# Patient Record
Sex: MALE | URBAN - METROPOLITAN AREA
[De-identification: names, ages, dates, MRNs, and addresses within clinical notes are randomized per-mention and may not be internally consistent; named-entity substitution may affect disease eponyms.]

---

## 2024-02-28 ENCOUNTER — APPOINTMENT (OUTPATIENT)
Dept: RADIOLOGY | Facility: MEDICAL CENTER | Age: 59
End: 2024-02-28
Attending: EMERGENCY MEDICINE

## 2024-02-28 ENCOUNTER — APPOINTMENT (OUTPATIENT)
Dept: RADIOLOGY | Facility: MEDICAL CENTER | Age: 59
End: 2024-02-28

## 2024-02-28 ENCOUNTER — HOSPITAL ENCOUNTER (EMERGENCY)
Facility: MEDICAL CENTER | Age: 59
End: 2024-02-28
Attending: EMERGENCY MEDICINE

## 2024-02-28 VITALS
SYSTOLIC BLOOD PRESSURE: 140 MMHG | WEIGHT: 190 LBS | TEMPERATURE: 97.1 F | BODY MASS INDEX: 23.14 KG/M2 | HEIGHT: 76 IN | DIASTOLIC BLOOD PRESSURE: 71 MMHG | RESPIRATION RATE: 16 BRPM | OXYGEN SATURATION: 95 % | HEART RATE: 78 BPM
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
## 2024-02-28 DIAGNOSIS — S06.0X1A CONCUSSION WITH LOSS OF CONSCIOUSNESS OF 30 MINUTES OR LESS, INITIAL ENCOUNTER: ICD-10-CM

## 2024-02-28 DIAGNOSIS — S27.329A CONTUSION OF LUNG, UNSPECIFIED LATERALITY, INITIAL ENCOUNTER: ICD-10-CM

## 2024-02-28 DIAGNOSIS — S16.1XXA STRAIN OF NECK MUSCLE, INITIAL ENCOUNTER: ICD-10-CM

## 2024-02-28 LAB
ABO GROUP BLD: NORMAL
ALBUMIN SERPL BCP-MCNC: 4 G/DL (ref 3.2–4.9)
ALBUMIN/GLOB SERPL: 1.3 G/DL
ALP SERPL-CCNC: 65 U/L (ref 30–99)
ALT SERPL-CCNC: 17 U/L (ref 2–50)
ANION GAP SERPL CALC-SCNC: 14 MMOL/L (ref 7–16)
APTT PPP: 29.6 SEC (ref 24.7–36)
AST SERPL-CCNC: 27 U/L (ref 12–45)
BILIRUB SERPL-MCNC: 0.4 MG/DL (ref 0.1–1.5)
BLD GP AB SCN SERPL QL: NORMAL
BUN SERPL-MCNC: 16 MG/DL (ref 8–22)
CALCIUM ALBUM COR SERPL-MCNC: 9 MG/DL (ref 8.5–10.5)
CALCIUM SERPL-MCNC: 9 MG/DL (ref 8.5–10.5)
CFT BLD TEG: 4.7 MIN (ref 4.6–9.1)
CFT P HPASE BLD TEG: 4.2 MIN (ref 4.3–8.3)
CHLORIDE SERPL-SCNC: 102 MMOL/L (ref 96–112)
CLOT ANGLE BLD TEG: 74.7 DEGREES (ref 63–78)
CLOT LYSIS 30M P MA LENFR BLD TEG: 0 % (ref 0–2.6)
CO2 SERPL-SCNC: 23 MMOL/L (ref 20–33)
CREAT SERPL-MCNC: 0.66 MG/DL (ref 0.5–1.4)
CT.EXTRINSIC BLD ROTEM: 1.1 MIN (ref 0.8–2.1)
ERYTHROCYTE [DISTWIDTH] IN BLOOD BY AUTOMATED COUNT: 38.4 FL (ref 35.9–50)
ETHANOL BLD-MCNC: <10.1 MG/DL
GFR SERPLBLD CREATININE-BSD FMLA CKD-EPI: 108 ML/MIN/1.73 M 2
GLOBULIN SER CALC-MCNC: 3 G/DL (ref 1.9–3.5)
GLUCOSE SERPL-MCNC: 125 MG/DL (ref 65–99)
HCT VFR BLD AUTO: 40.8 % (ref 42–52)
HGB BLD-MCNC: 14.3 G/DL (ref 14–18)
INR PPP: 1.06 (ref 0.87–1.13)
MCF BLD TEG: 63.2 MM (ref 52–69)
MCF.PLATELET INHIB BLD ROTEM: 21.7 MM (ref 15–32)
MCH RBC QN AUTO: 32.1 PG (ref 27–33)
MCHC RBC AUTO-ENTMCNC: 35 G/DL (ref 32.3–36.5)
MCV RBC AUTO: 91.5 FL (ref 81.4–97.8)
PA AA BLD-ACNC: 26.6 % (ref 0–11)
PA ADP BLD-ACNC: 75.5 % (ref 0–17)
PLATELET # BLD AUTO: 219 K/UL (ref 164–446)
PMV BLD AUTO: 11.2 FL (ref 9–12.9)
POTASSIUM SERPL-SCNC: 3.7 MMOL/L (ref 3.6–5.5)
PROT SERPL-MCNC: 7 G/DL (ref 6–8.2)
PROTHROMBIN TIME: 13.9 SEC (ref 12–14.6)
RBC # BLD AUTO: 4.46 M/UL (ref 4.7–6.1)
RH BLD: NORMAL
SODIUM SERPL-SCNC: 139 MMOL/L (ref 135–145)
TEG ALGORITHM TGALG: ABNORMAL
WBC # BLD AUTO: 8.4 K/UL (ref 4.8–10.8)

## 2024-02-28 PROCEDURE — 71045 X-RAY EXAM CHEST 1 VIEW: CPT

## 2024-02-28 PROCEDURE — 99284 EMERGENCY DEPT VISIT MOD MDM: CPT | Performed by: SURGERY

## 2024-02-28 PROCEDURE — 82077 ASSAY SPEC XCP UR&BREATH IA: CPT

## 2024-02-28 PROCEDURE — 85730 THROMBOPLASTIN TIME PARTIAL: CPT

## 2024-02-28 PROCEDURE — 72170 X-RAY EXAM OF PELVIS: CPT

## 2024-02-28 PROCEDURE — 86900 BLOOD TYPING SEROLOGIC ABO: CPT

## 2024-02-28 PROCEDURE — 85610 PROTHROMBIN TIME: CPT

## 2024-02-28 PROCEDURE — 86901 BLOOD TYPING SEROLOGIC RH(D): CPT

## 2024-02-28 PROCEDURE — 305950 HCHG YELLOW TRAUMA ACT PRE-NOTIFY NO CC

## 2024-02-28 PROCEDURE — 71260 CT THORAX DX C+: CPT

## 2024-02-28 PROCEDURE — 36415 COLL VENOUS BLD VENIPUNCTURE: CPT

## 2024-02-28 PROCEDURE — 700117 HCHG RX CONTRAST REV CODE 255: Performed by: EMERGENCY MEDICINE

## 2024-02-28 PROCEDURE — 86850 RBC ANTIBODY SCREEN: CPT

## 2024-02-28 PROCEDURE — 85384 FIBRINOGEN ACTIVITY: CPT | Mod: 91

## 2024-02-28 PROCEDURE — 85027 COMPLETE CBC AUTOMATED: CPT

## 2024-02-28 PROCEDURE — 72131 CT LUMBAR SPINE W/O DYE: CPT

## 2024-02-28 PROCEDURE — 72128 CT CHEST SPINE W/O DYE: CPT

## 2024-02-28 PROCEDURE — 72125 CT NECK SPINE W/O DYE: CPT

## 2024-02-28 PROCEDURE — 85576 BLOOD PLATELET AGGREGATION: CPT | Mod: 91

## 2024-02-28 PROCEDURE — 70450 CT HEAD/BRAIN W/O DYE: CPT

## 2024-02-28 PROCEDURE — 80053 COMPREHEN METABOLIC PANEL: CPT

## 2024-02-28 PROCEDURE — 99285 EMERGENCY DEPT VISIT HI MDM: CPT

## 2024-02-28 PROCEDURE — 85347 COAGULATION TIME ACTIVATED: CPT

## 2024-02-28 RX ORDER — CETIRIZINE HYDROCHLORIDE 10 MG/1
10 TABLET ORAL DAILY
Status: SHIPPED | COMMUNITY
End: 2024-02-28

## 2024-02-28 RX ORDER — CETIRIZINE HYDROCHLORIDE 10 MG/1
10 TABLET ORAL DAILY
COMMUNITY

## 2024-02-28 RX ADMIN — IOHEXOL 100 ML: 350 INJECTION, SOLUTION INTRAVENOUS at 16:04

## 2024-02-28 ASSESSMENT — PAIN DESCRIPTION - PAIN TYPE: TYPE: ACUTE PAIN
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
## 2024-02-28 NOTE — DISCHARGE PLANNING
Trauma Red Transfer    Pt arrived via [facility] from [facility] Pt is Murtaza Farooq 02-. Report given Pt fell backwards off a swing,  approximately 4 feet . Pt mother is en route to the hospital by private vehicle. Mother is Michelle Farooq [phone]. SW called Pt mother to notify her Pt. Had arrived, SW verified she had directions to the hospital.     SW will wait for family to arrive and will walk up to PICU .

## 2024-02-29 PROBLEM — T14.90XA TRAUMA: Status: ACTIVE | Noted: 2024-02-29

## 2024-02-29 PROBLEM — S06.0X1A CONCUSSION WITH LOSS OF CONSCIOUSNESS OF 30 MINUTES OR LESS: Status: ACTIVE | Noted: 2024-02-29

## 2024-02-29 PROBLEM — S27.321A LEFT PULMONARY CONTUSION: Status: ACTIVE | Noted: 2024-02-29
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
## 2024-02-29 NOTE — ASSESSMENT & PLAN NOTE
Left-sided airspace opacity noted on CT chest.  Never required supplemental oxygen in the trauma bay or ER.  Discussed that the pulmonary contusion may worsen over the next several hours and offered admission for observation. The patient felt comfortable being discharged and representing if needed.

## 2024-02-29 NOTE — ED NOTES
Med Rec complete per patient   Allergies reviewed  Antibiotics in the past 30 days:no  Anticoagulant in past 14 days:no  Pharmacy patient utilizes:[facility]    Pt states he doesn't take any RX medications
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
## 2024-02-29 NOTE — ED NOTES
"Pt self removed cervical collar and ambulated out of room, pt educated that he was not cleared to get up. Pt states he \"feels fine\" and needs to use the bathroom. Pt assisted to bathroom with RN standby. Dr. Balderas to bedside, states pt does not need c-collar.   "

## 2024-02-29 NOTE — DISCHARGE INSTRUCTIONS
Neck Injury  (Cervical Strain, Care After)    Take ibuprofen 600 mg every 6 hours or naproxen 440 mg every 8-12 hours for 7 days.  You can mix Tylenol with either of those medicines if needed.  Return for weakness or fever and headache.  See your doctor if not better in 7-10 days.       You have a cervical strain. The muscles and ligaments in your neck have been stretched. The bones are not broken.    HOME CARE INSTRUCTIONS  While awake, apply ice packs to the neck or areas of pain about every 1-2 hours, for 20 to 30 minutes at a time. Do this for 2 days or as directed. If you were given a cervical collar for support, ask your caregiver if you may remove it for bathing or applying ice.  After 2 days, you may apply heat to the area for 20 to 30 minutes, 4 to 5 times a day.  If given a Wheeler collar, wear as instructed. Do not remove any collar unless instructed by a caregiver.  Take prescribed medication as directed. You may use ibuprofen (Advil® or Motrin®) and acetaminophen (Tylenol®) for discomfort. Only use these if your caregiver has not given medications that interfere    Recheck with the hospital or clinic after a radiologist has read your x-rays. Recheck with the hospital or clinic to make sure the initial readings are correct. Do this also to determine if you need further studies. It is your responsibility to find out your x-ray results. X-rays are sometimes repeated in one week to ten days. These are often repeated to make sure that a hairline fracture was not overlooked. Ask your caregiver how you are to find out about your radiology (x-ray) results.    seek immediate medical attention IF:  You develop difficulties swallowing or breathing.  You have numbness, weakness or movement problems in you or your arms or legs.  You develop increasing pain which is uncontrolled with medications.    AGREEMENT BETWEEN PATIENT AND HEALTHCARE TEAM:  Your signature on this document represents an understanding  between you and the healthcare team that took care of you today.  That means that you:  Understand these discharge instructions.   Will monitor your condition.  Will seek immediate medical attention as instructed.    Document Released: 12/18/2006  Document Re-Released: 06/30/2008  [employer]Â®® Patient Information ©2008 [employer].

## 2024-02-29 NOTE — ED NOTES
Pt alert & oriented x4, ambulatory with a steady gait and cleared for discharge by ERP. VSS and pt is in nad. Pt planning to use uber to obtain ride to his condo which he has keys to. I gave him the number to [employer] who has his car keys and skis. Pt will obtain ride there tomorrow to obtain his car. Pt denies any pain upon discharge.   Pt discharged home with written and verbal instructions regarding f/u, activity, reasons to return to ED. Verbalized understanding, all questions addressed, ambulated out to lobby with a steady gait with all belongings, pt provided shoes.